# Patient Record
Sex: MALE | Race: WHITE | Employment: FULL TIME | URBAN - NONMETROPOLITAN AREA
[De-identification: names, ages, dates, MRNs, and addresses within clinical notes are randomized per-mention and may not be internally consistent; named-entity substitution may affect disease eponyms.]

---

## 2024-04-08 ENCOUNTER — OFFICE VISIT (OUTPATIENT)
Dept: ORTHOPEDIC SURGERY | Age: 59
End: 2024-04-08

## 2024-04-08 VITALS — WEIGHT: 225 LBS | HEIGHT: 75 IN | BODY MASS INDEX: 27.98 KG/M2

## 2024-04-08 DIAGNOSIS — S69.92XA INJURY OF LEFT WRIST, INITIAL ENCOUNTER: ICD-10-CM

## 2024-04-08 DIAGNOSIS — S52.592A OTHER CLOSED FRACTURE OF DISTAL END OF LEFT RADIUS, INITIAL ENCOUNTER: Primary | ICD-10-CM

## 2024-04-08 PROCEDURE — 99203 OFFICE O/P NEW LOW 30 MIN: CPT | Performed by: PHYSICIAN ASSISTANT

## 2024-04-08 PROCEDURE — 29125 APPL SHORT ARM SPLINT STATIC: CPT | Performed by: PHYSICIAN ASSISTANT

## 2024-04-08 NOTE — PROGRESS NOTES
[facility]  New Patient Note      CHIEF COMPLAINT:   Chief Complaint   Patient presents with    Wrist Pain     Pt presents this AM with c/o pain in his L wrist. States that he has noticed pain since 4/6. Injured it when he fell off a ladder. Pain is in radial side of wrist. Swelling is noted. Has not taken anything to manage his symptoms. Pt is R handed.        HISTORY OF PRESENT ILLNESS:                The patient is a 58 y.o. male who presents today with complaints of left wrist pain x/6/24 when he fell off a ladder and fell on an outstretched hand.  Pt localizes the pain to radial aspect of the wrist.  Pt denies any numbness, tingling, loss of sensation or radiation of symptoms into fingers.  Pain is worse with palpation, range of motion.  They have tried at home therapies of none.  Pt has never injured this wrist in the past.  Pt is right hand dominant.        Past Medical History:    No past medical history on file.  Past Surgical History:    No past surgical history on file.  Current Medications:   No current facility-administered medications for this visit.  Allergies:  Patient has no allergy information on record.    Social History:   TOBACCO:   has no history on file for tobacco use.  ETOH:   has no history on file for alcohol use.  DRUGS:   has no history on file for drug use.    Review of Systems   Constitutional: Negative for fever, chills, diaphoresis, appetite change and fatigue.   HENT: Negative for dental issues, hearing loss and tinnitus. Negative for congestion, sinus pressure, sneezing, sore throat. Negative for headache.  Eyes: Negative for visual disturbance, blurred and double vision. Negative for pain, discharge, redness and itching  Respiratory: Negative for cough, shortness of breath and wheezing.   Cardiovascular: Negative for chest pain, palpitations and leg swelling. No dyspnea on exertion   Gastrointestinal:   Negative for nausea, vomiting, abdominal pain,

## 2024-04-12 ENCOUNTER — OFFICE VISIT (OUTPATIENT)
Dept: ORTHOPEDIC SURGERY | Age: 59
End: 2024-04-12

## 2024-04-12 VITALS — WEIGHT: 225 LBS | BODY MASS INDEX: 27.98 KG/M2 | HEIGHT: 75 IN

## 2024-04-12 DIAGNOSIS — S52.592A OTHER CLOSED FRACTURE OF DISTAL END OF LEFT RADIUS, INITIAL ENCOUNTER: Primary | ICD-10-CM

## 2024-04-12 PROCEDURE — 29075 APPL CST ELBW FNGR SHORT ARM: CPT | Performed by: PHYSICIAN ASSISTANT

## 2024-04-12 PROCEDURE — 99212 OFFICE O/P EST SF 10 MIN: CPT | Performed by: PHYSICIAN ASSISTANT

## 2024-04-12 NOTE — PROGRESS NOTES
to a local emergency department.  I did again offer to send him to hand surgeon for an opinion as this is intra-articular and a longitudinal type fracture.  He declines and states he just wants to proceed with casting.  He does understand the risks associated.  If any issues arise prior to that visit, they should call the office or return to the clinic.  Pt voiced understanding and agrees with plan.      Electronically signed by JUDD HOLMAN PA-C on 4/12/24 at 10:01 AM EDT    **This report was transcribed using voice recognition software. Every effort was made to ensure accuracy; however, inadvertent computerized transcription errors may be present.**

## 2024-04-19 ENCOUNTER — OFFICE VISIT (OUTPATIENT)
Dept: ORTHOPEDIC SURGERY | Age: 59
End: 2024-04-19

## 2024-04-19 DIAGNOSIS — S52.592D OTHER CLOSED FRACTURE OF DISTAL END OF LEFT RADIUS WITH ROUTINE HEALING, SUBSEQUENT ENCOUNTER: Primary | ICD-10-CM

## 2024-04-19 PROCEDURE — 99024 POSTOP FOLLOW-UP VISIT: CPT | Performed by: PHYSICIAN ASSISTANT

## 2024-04-19 NOTE — PROGRESS NOTES
Established Patient Follow Up  [facility]    Chief Complaint   Patient presents with    Follow-up         Subjective:  Pt is a 59 y.o. male, established pt who presents today for follow up of left distal radius fracture and cast check.  Please refer to the last clinic note from 4/12/2024 as none of the patient's past medical hx has changed.  Pt reports he is doing well with no complaints at this time.  Has no concerns for his cast.      Objective:  There were no vitals filed for this visit.    Pt is alert and oriented x 3, NAD, sitting comfortable in the exam room today.  Patient has clean dry and intact cast without evidence of skin breakdown or loosening.    Radiology Imaging:  No new imaging at this time.    Procedure:  none    Assessment:  Encounter Diagnosis   Name Primary?    Other closed fracture of distal end of left radius with routine healing, subsequent encounter Yes       Plan:  Pt returns to the clinic today for f/u of  and cast check.  Pt is doing well at this time with no complaints.  No visible skin breakdown, loosening of the cast.  We will see pt back in 4 weeks weeks for planned cast removal and re-imaging.  If any issues arise prior to that visit, they should call the office or return to the clinic.  Pt voiced understanding and agrees with plan.      Electronically signed by JUDD HOLMAN PA-C on 4/19/24 at 11:02 AM EDT    **This report was transcribed using voice recognition software. Every effort was made to ensure accuracy; however, inadvertent computerized transcription errors may be present.**

## 2024-05-24 ENCOUNTER — OFFICE VISIT (OUTPATIENT)
Dept: ORTHOPEDIC SURGERY | Age: 59
End: 2024-05-24

## 2024-05-24 VITALS — WEIGHT: 225 LBS | BODY MASS INDEX: 27.98 KG/M2 | HEIGHT: 75 IN

## 2024-05-24 DIAGNOSIS — S52.592D OTHER CLOSED FRACTURE OF DISTAL END OF LEFT RADIUS WITH ROUTINE HEALING, SUBSEQUENT ENCOUNTER: Primary | ICD-10-CM

## 2024-05-24 PROCEDURE — 99212 OFFICE O/P EST SF 10 MIN: CPT | Performed by: PHYSICIAN ASSISTANT

## 2024-05-24 NOTE — PROGRESS NOTES
with good positioning.  At this time we are going to transition him to a universal wrist brace for 1 week.  I did advise him I would like him to be nonweightbearing with the wrist brace for 1 additional week.  After a week he can begin weightbearing and slowly come out of the brace.  He can take the brace off for hygiene purposes and for sleeping.  I did provide him with a home exercise program that he can begin at this time to slowly regain the last few degrees of wrist flexion.  Pt voiced understanding and agrees with the treatment plan provided in the office today.  They will call with any additional questions or concerns they may have.  I will see them back PRN for follow up.    Electronically signed by JUDD HOLMAN PA-C on 5/24/24 at 9:54 AM EDT    **This report was transcribed using voice recognition software. Every effort was made to ensure accuracy; however, inadvertent computerized transcription errors may be present.**